# Patient Record
Sex: MALE | Race: BLACK OR AFRICAN AMERICAN | NOT HISPANIC OR LATINO | Employment: FULL TIME | RURAL
[De-identification: names, ages, dates, MRNs, and addresses within clinical notes are randomized per-mention and may not be internally consistent; named-entity substitution may affect disease eponyms.]

---

## 2023-07-21 ENCOUNTER — HOSPITAL ENCOUNTER (EMERGENCY)
Facility: HOSPITAL | Age: 23
Discharge: HOME | End: 2023-07-21
Attending: EMERGENCY MEDICINE
Payer: MEDICAID

## 2023-07-21 VITALS
HEART RATE: 69 BPM | TEMPERATURE: 99 F | DIASTOLIC BLOOD PRESSURE: 78 MMHG | OXYGEN SATURATION: 98 % | SYSTOLIC BLOOD PRESSURE: 114 MMHG | RESPIRATION RATE: 14 BRPM | HEIGHT: 71 IN | WEIGHT: 145 LBS | BODY MASS INDEX: 20.3 KG/M2

## 2023-07-21 DIAGNOSIS — R21 RASH: Primary | ICD-10-CM

## 2023-07-21 PROCEDURE — 99284 EMERGENCY DEPT VISIT MOD MDM: CPT | Mod: ,,, | Performed by: EMERGENCY MEDICINE

## 2023-07-21 PROCEDURE — 99283 EMERGENCY DEPT VISIT LOW MDM: CPT

## 2023-07-21 PROCEDURE — 99284 PR EMERGENCY DEPT VISIT,LEVEL IV: ICD-10-PCS | Mod: ,,, | Performed by: EMERGENCY MEDICINE

## 2023-07-21 RX ORDER — MUPIROCIN 20 MG/G
OINTMENT TOPICAL 3 TIMES DAILY
Qty: 15 G | Refills: 0 | Status: SHIPPED | OUTPATIENT
Start: 2023-07-21 | End: 2023-07-28

## 2023-07-21 NOTE — ED PROVIDER NOTES
Encounter Date: 7/21/2023       History     Chief Complaint   Patient presents with    Rash     Reports rash and itching to genital area for several weeks. Possible exposure to STI. Denies penile pain. Denies discharge, bleeding or dysuria. Denies any lesions or sores to genitals.      A 22 year the patient came the emergency department complaining of burning and itching in the genital area 4 weeks.  He denies penile discharge or dysuria and denies lesions or sores on his genitals.    The history is provided by the patient. No  was used.   Review of patient's allergies indicates:  No Known Allergies  No past medical history on file.  No past surgical history on file.  No family history on file.     Review of Systems   Constitutional:  Negative for fever.   HENT:  Negative for sore throat.    Respiratory:  Negative for shortness of breath.    Cardiovascular:  Negative for chest pain.   Gastrointestinal:  Negative for nausea.   Genitourinary:  Negative for dysuria.   Musculoskeletal:  Negative for back pain.   Skin:  Positive for rash (Genital area irritation).   Neurological:  Negative for weakness.   Hematological:  Does not bruise/bleed easily.   All other systems reviewed and are negative.    Physical Exam     Initial Vitals [07/21/23 0159]   BP Pulse Resp Temp SpO2   114/78 69 14 98.9 °F (37.2 °C) 98 %      MAP       --         Physical Exam    Nursing note and vitals reviewed.  Constitutional: He appears well-developed.   HENT:   Head: Normocephalic and atraumatic.   Eyes: EOM are normal. Pupils are equal, round, and reactive to light.   Neck: Neck supple.   Pulmonary/Chest: No respiratory distress.   Genitourinary:       Musculoskeletal:         General: Normal range of motion.      Cervical back: Neck supple.     Skin: Skin is warm.   Psychiatric: He has a normal mood and affect.       Medical Screening Exam   See Full Note    ED Course   Procedures  Labs Reviewed - No data to display        Imaging Results    None          Medications - No data to display  Medical Decision Making:   Initial Assessment:   22-year-old male patient who has 4 week irritation to the tip of his penis.  Physical examination is unremarkable.  Differential Diagnosis:   Irritation of the penile area  Herpes genitalis    ED Management:  The patient will be treated with topical antibiotic to the repeated area and asked to follow up with his primary care provider                       Clinical Impression:   Final diagnoses:  [R21] Rash (Primary)        ED Disposition Condition    Discharge Stable          ED Prescriptions       Medication Sig Dispense Start Date End Date Auth. Provider    mupirocin (BACTROBAN) 2 % ointment Apply topically 3 (three) times daily. for 7 days 15 g 7/21/2023 7/28/2023 Gelacio Jara MD          Follow-up Information    None          Gelacio Jara MD  07/21/23 0095

## 2023-07-21 NOTE — ED NOTES
Chau Dominguez, a 22 y.o. male presents to the ED w/ complaint of itching to genital area everyday for several weeks. No noted rash or lesions on exam. Denies dysuria or penile drainage. AAOX3. Abd soft, nontender. RR even unlabored. Ambulatory.     Triage note:  Chief Complaint   Patient presents with    Rash     Reports rash and itching to genital area for several weeks. Possible exposure to STI. Denies penile pain. Denies discharge, bleeding or dysuria. Denies any lesions or sores to genitals.      Review of patient's allergies indicates:  No Known Allergies  No past medical history on file.